# Patient Record
Sex: FEMALE | Race: WHITE | NOT HISPANIC OR LATINO
[De-identification: names, ages, dates, MRNs, and addresses within clinical notes are randomized per-mention and may not be internally consistent; named-entity substitution may affect disease eponyms.]

---

## 2019-08-18 ENCOUNTER — TRANSCRIPTION ENCOUNTER (OUTPATIENT)
Age: 54
End: 2019-08-18

## 2019-08-20 ENCOUNTER — TRANSCRIPTION ENCOUNTER (OUTPATIENT)
Age: 54
End: 2019-08-20

## 2021-03-26 ENCOUNTER — TRANSCRIPTION ENCOUNTER (OUTPATIENT)
Age: 56
End: 2021-03-26

## 2021-11-19 PROBLEM — Z00.00 ENCOUNTER FOR PREVENTIVE HEALTH EXAMINATION: Status: ACTIVE | Noted: 2021-11-19

## 2021-11-22 ENCOUNTER — APPOINTMENT (OUTPATIENT)
Dept: OBGYN | Facility: CLINIC | Age: 56
End: 2021-11-22
Payer: MEDICAID

## 2021-11-22 VITALS
RESPIRATION RATE: 16 BRPM | HEIGHT: 65 IN | BODY MASS INDEX: 31.99 KG/M2 | TEMPERATURE: 97.1 F | WEIGHT: 192 LBS | DIASTOLIC BLOOD PRESSURE: 78 MMHG | SYSTOLIC BLOOD PRESSURE: 122 MMHG

## 2021-11-22 DIAGNOSIS — Z78.9 OTHER SPECIFIED HEALTH STATUS: ICD-10-CM

## 2021-11-22 DIAGNOSIS — N83.291 OTHER OVARIAN CYST, RIGHT SIDE: ICD-10-CM

## 2021-11-22 DIAGNOSIS — Z87.19 PERSONAL HISTORY OF OTHER DISEASES OF THE DIGESTIVE SYSTEM: ICD-10-CM

## 2021-11-22 PROCEDURE — 99204 OFFICE O/P NEW MOD 45 MIN: CPT | Mod: 25

## 2021-11-22 PROCEDURE — 76830 TRANSVAGINAL US NON-OB: CPT

## 2021-11-22 RX ORDER — AMOXICILLIN AND CLAVULANATE POTASSIUM 875; 125 MG/1; MG/1
875-125 TABLET, COATED ORAL
Qty: 20 | Refills: 0 | Status: ACTIVE | COMMUNITY
Start: 2021-11-20

## 2021-11-22 RX ORDER — PANTOPRAZOLE 40 MG/1
40 TABLET, DELAYED RELEASE ORAL
Qty: 90 | Refills: 0 | Status: ACTIVE | COMMUNITY
Start: 2021-10-25

## 2021-11-26 LAB
CA 125 (LABCORP): 17.2 U/ML
HE4X: 50.4 PMOL/L
POSTMENOPAUSAL ROMA: 1.27
PREMENOPAUSAL ROMA: 0.76
ROMA COMMENT: NORMAL

## 2021-11-30 ENCOUNTER — NON-APPOINTMENT (OUTPATIENT)
Age: 56
End: 2021-11-30

## 2021-12-03 ENCOUNTER — RESULT REVIEW (OUTPATIENT)
Age: 56
End: 2021-12-03

## 2021-12-03 ENCOUNTER — APPOINTMENT (OUTPATIENT)
Dept: OBGYN | Facility: HOSPITAL | Age: 56
End: 2021-12-03

## 2021-12-03 NOTE — DISCUSSION/SUMMARY
[FreeTextEntry1] : 56 yo with recurrent diverticulitis and right ovarian cyst. \par -GRACE testing today given septation\par -Given ~10 yrs menopausal, discussed recommendation for laparoscopic BSO which will prevent cysts from recurring and decrease risk of ovarian cancer in the future. She is considering this. Discussed recommendation for at least prophylactic salpingectomy which also decreased risk of ovarian cancer, also less so than BSO. For now, we discussed plan for laparoscopic RSO, left salpingectomy.  Discussed possible hysterectomy only if uterus is involved in significant inflammation from recurrent diverticulitis, which we are not expecting\par -Tentative plan for robot-assisted laparoscopic RSO, left salpingectomy, possible left oophorectomy, possible hysterectomy, possible cystoscopy given Dr. Schulte's portion will be robotic.\par -Discussed risks to include, but are not limited to, bleeding, possible transfusion, infection, fistula, damage to nearby organs, vessels, or nerves resulting in temporary or permanent injury, deep venous thrombosis, and perioperative death. Discussed risk of laparotomy if unable to have adequate visualization to complete using minimally invasive approach. Discussed the need for contained extraction of the specimen in a bag. Discussed rare risk of malignancy which would require subsequent surgery. Discussed risk of spread of incidental rupture of cyst during the surgery. She also understands the limitations of laparoscopic surgery and the possibility of missing a surgical complication with need for subsequent re-exploration. She also understands the rationale for a possible cystoscopy at the completion of the procedure and the potential risks of cystoscopy. \par -Postoperative pain management will be determined by Dr. Schulte.  Discussed postoperative expectations and restrictions. She agrees to proceed. All of her questions were answered to her satisfaction. \par -We will coordinate her surgical date with Dr. Schulte and she will be contacted\par -She will need medical clearance

## 2021-12-03 NOTE — HISTORY OF PRESENT ILLNESS
[Patient reported mammogram was normal] : Patient reported mammogram was normal [Patient reported PAP Smear was normal] : Patient reported PAP Smear was normal [postmenopausal] : postmenopausal [N] : Patient denies prior pregnancies [Mammogramdate] : 2021 [PapSmeardate] : 10/2021 [LMPDate] : age 45 [PGHxTotal] : 2 [PGxEctopic] : 2 [FreeTextEntry1] : 2 ectopics s/p laparoscopic surgery and laparotomy. +h/o cyst, denies fibroids, denies abnormal pap, STI.

## 2021-12-03 NOTE — PHYSICAL EXAM
[Chaperone Present] : A chaperone was present in the examining room during all aspects of the physical examination [Appropriately responsive] : appropriately responsive [Alert] : alert [No Acute Distress] : no acute distress [Soft] : soft [Non-distended] : non-distended [No HSM] : No HSM [No Lesions] : no lesions [No Mass] : no mass [Oriented x3] : oriented x3 [Labia Majora] : normal [Labia Minora] : normal [Normal] : normal [Uterine Adnexae] : normal [FreeTextEntry1] : AILIN Puga  [FreeTextEntry7] : tenderness on deep palpation left side, no rebound/guarding [Tenderness] : nontender [Enlarged ___ wks] : not enlarged

## 2021-12-03 NOTE — LETTER GREETING
[Dear  ___] : Dear  [unfilled], [FreeTextEntry1] : I had the pleasure of evaluating your patient, ERASMO CEDILLO. Please see my summary of recommendations followed by my full consultation note. \par \par Thank you for allowing me to participate in the care of this patient. If you have any questions, please do not hesitate to contact me.\par \par Sincerely,\par \par Nadia Gaona MD, FACOG \par [facility]\par Obstetrics and Gynecology in [place]\[address], [facility] [address]\[place] [address]\[facility] Phone: [phone] Fax: [phone]

## 2021-12-03 NOTE — REASON FOR VISIT
[Consultation] : consultation for [FreeTextEntry2] : ovarian cyst [FreeTextEntry1] : Dr. Ayush Schulte

## 2021-12-03 NOTE — PROCEDURE
[Suspected Ovarian Cyst] : suspected ovarian cyst [Transvaginal Ultrasound] : transvaginal ultrasound [Anteverted] : anteverted [FreeTextEntry5] : 6.1x4.0x2.8cm, EE 2.9mm, intramural fibroid 1.3x1.3cm [FreeTextEntry7] : right ovarian cyst simple with small septation noted measuring 4.6x4.0, encompassing right ovary [FreeTextEntry8] : unable to visualize [FreeTextEntry4] : Right ovarian cyst decreased from prior

## 2021-12-21 ENCOUNTER — TRANSCRIPTION ENCOUNTER (OUTPATIENT)
Age: 56
End: 2021-12-21

## 2021-12-23 ENCOUNTER — APPOINTMENT (OUTPATIENT)
Dept: OBGYN | Facility: CLINIC | Age: 56
End: 2021-12-23
Payer: MEDICAID

## 2021-12-23 VITALS
BODY MASS INDEX: 31.99 KG/M2 | RESPIRATION RATE: 16 BRPM | DIASTOLIC BLOOD PRESSURE: 84 MMHG | WEIGHT: 192 LBS | SYSTOLIC BLOOD PRESSURE: 138 MMHG | HEIGHT: 65 IN

## 2021-12-23 DIAGNOSIS — Z48.89 ENCOUNTER FOR OTHER SPECIFIED SURGICAL AFTERCARE: ICD-10-CM

## 2021-12-23 PROCEDURE — 99024 POSTOP FOLLOW-UP VISIT: CPT

## 2021-12-24 PROBLEM — Z48.89 POSTOPERATIVE VISIT: Status: ACTIVE | Noted: 2021-12-24

## 2021-12-24 NOTE — DISCUSSION/SUMMARY
[FreeTextEntry1] : 55 yo s/p robot-assisted laparoscopic BSO by me and sigmoid resection by Dr. Schulte doing very well postoperatively. \par -Continue follow up with Dr. Schulte as scheduled\par -No additional GYN postoperative follow-up needed\par -Resume all GYN activities when cleared by Dr. Schulte

## 2021-12-24 NOTE — HISTORY OF PRESENT ILLNESS
[FreeTextEntry1] : 55 yo s/p robot-assisted laparoscopic BSO by me and sigmoid resection by Dr. Schulte here for postoperative visit. Pt states feeling well. Pain improving daily. Tolerating po, voiding, normal BM, ambulating. Denies vaginal bleeding. \par \par Pathology: right ovary: serous cystadenoma, right tube normal. Left tube/ovary normal\par \par Intraoperative and pathology findings reviewed, all questions answered.

## 2021-12-24 NOTE — PHYSICAL EXAM
[Chaperone Present] : A chaperone was present in the examining room during all aspects of the physical examination [FreeTextEntry1] : AILIN Puga  [Appropriately responsive] : appropriately responsive [Alert] : alert [No Acute Distress] : no acute distress [No Lymphadenopathy] : no lymphadenopathy [Soft] : soft [Non-tender] : non-tender [Non-distended] : non-distended [No HSM] : No HSM [No Lesions] : no lesions [No Mass] : no mass [Oriented x3] : oriented x3 [FreeTextEntry7] : incisions c/d/i, no erythema/drainage

## 2022-07-06 ENCOUNTER — APPOINTMENT (OUTPATIENT)
Dept: OBGYN | Facility: CLINIC | Age: 57
End: 2022-07-06

## 2022-07-06 VITALS
BODY MASS INDEX: 32.49 KG/M2 | WEIGHT: 195 LBS | HEIGHT: 65 IN | DIASTOLIC BLOOD PRESSURE: 74 MMHG | RESPIRATION RATE: 16 BRPM | SYSTOLIC BLOOD PRESSURE: 132 MMHG

## 2022-07-06 DIAGNOSIS — R23.2 FLUSHING: ICD-10-CM

## 2022-07-06 PROCEDURE — 99213 OFFICE O/P EST LOW 20 MIN: CPT

## 2022-07-06 NOTE — DISCUSSION/SUMMARY
[FreeTextEntry1] : 55 yo with hot flashes. \par -Discussed unusual to be due to BSO given >10 yrs from menopause\par -REcommend labwork to evaluate for other etiologies: blood collected in office by AILIN Puga\par -Discussed options will depend on results. Consider trial of Estroven as this worked well for her in the past. Also discussed nonhormonal paroxetine.

## 2022-07-06 NOTE — HISTORY OF PRESENT ILLNESS
[FreeTextEntry1] : 55 yo s/p robot-assisted laparoscopic BSO by me and sigmoid resection by Dr. Schulte 12/2021 here c/o worsening hot flashes. States started 2 months after her surgery, now 3-4 during day and twice overnight. Menopause age 45, states initially had hot flashes during menopause that then resolved. She is otherwise doing well postoperatively.

## 2022-07-06 NOTE — PHYSICAL EXAM
[Appropriately responsive] : appropriately responsive [Alert] : alert [No Acute Distress] : no acute distress [No Lymphadenopathy] : no lymphadenopathy [Soft] : soft [Non-tender] : non-tender [Non-distended] : non-distended [No HSM] : No HSM [No Lesions] : no lesions [No Mass] : no mass [Oriented x3] : oriented x3 [FreeTextEntry7] : incisions well healed

## 2022-07-08 ENCOUNTER — NON-APPOINTMENT (OUTPATIENT)
Age: 57
End: 2022-07-08

## 2022-11-11 ENCOUNTER — RESULT REVIEW (OUTPATIENT)
Age: 57
End: 2022-11-11

## 2023-10-30 ENCOUNTER — NON-APPOINTMENT (OUTPATIENT)
Age: 58
End: 2023-10-30

## 2024-11-27 ENCOUNTER — NON-APPOINTMENT (OUTPATIENT)
Age: 59
End: 2024-11-27

## 2024-12-09 ENCOUNTER — NON-APPOINTMENT (OUTPATIENT)
Age: 59
End: 2024-12-09

## 2025-02-17 ENCOUNTER — NON-APPOINTMENT (OUTPATIENT)
Age: 60
End: 2025-02-17